# Patient Record
Sex: FEMALE | Race: BLACK OR AFRICAN AMERICAN
[De-identification: names, ages, dates, MRNs, and addresses within clinical notes are randomized per-mention and may not be internally consistent; named-entity substitution may affect disease eponyms.]

---

## 2018-08-29 ENCOUNTER — HOSPITAL ENCOUNTER (OUTPATIENT)
Dept: HOSPITAL 74 - JASU-SURG | Age: 48
Discharge: HOME | End: 2018-08-29
Attending: ORTHOPAEDIC SURGERY
Payer: COMMERCIAL

## 2018-08-29 VITALS — SYSTOLIC BLOOD PRESSURE: 145 MMHG | HEART RATE: 82 BPM | DIASTOLIC BLOOD PRESSURE: 87 MMHG

## 2018-08-29 VITALS — BODY MASS INDEX: 29.2 KG/M2

## 2018-08-29 VITALS — TEMPERATURE: 98.2 F
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
## 2018-08-29 DIAGNOSIS — S82.841A: Primary | ICD-10-CM
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
## 2018-08-29 PROCEDURE — 0QSG04Z REPOSITION RIGHT TIBIA WITH INTERNAL FIXATION DEVICE, OPEN APPROACH: ICD-10-PCS | Performed by: ORTHOPAEDIC SURGERY

## 2018-08-29 PROCEDURE — 27814 TREATMENT OF ANKLE FRACTURE: CPT

## 2018-08-29 NOTE — OP
Operative Note





- Note:


Operative Date: 08/29/18


Pre-Operative Diagnosis: right ankle tibia/fibula fracture


Operation: open reduction internal fixation of bimalleolar right ankle fracture


Surgeon: Blanco Pace


Assistant: Rossana Last


Anesthesiologist/CRNA: Josh Kennedy


Anesthesia: Local


Estimated Blood Loss (mls): 20


Fluid Volume Replaced (mls): 800


Operative Report Dictated: Yes

## 2018-08-29 NOTE — SURG
Surgery First Assist Note


First Assist: Rossana Last PA-C


Date of Service: 08/29/18


Diagnosis: 





right ankle bimalleolar fracture


Procedure: 





open reduction/internal fixation of bimalleolar ankle fracture


I was present for the entirety of the operative procedure. For further detail, 

please refer to operative report.








Visit type





- Case Type


Case Type: Scheduled





- Emergency


Emergency Visit: No





- New patient


This patient is new to me today: Yes


Date on this admission: 08/29/18

## 2018-08-30 NOTE — OP
DATE OF OPERATION:  08/29/2018

 

PREOPERATIVE DIAGNOSIS:  Right bimalleolar ankle fracture.    

 

POSTOPERATIVE DIAGNOSIS:  Right bimalleolar ankle fracture.  

 

PROCEDURE:  Open reduction internal fixation of right bimalleolar ankle fracture.  

 

SURGICAL ATTENDING:  Blanco Pace MD

 

FIRST ASSISTANT:  CHRISTIANO Solitario

 

ANESTHESIA:  Spinal and regional.  

 

CLOSURE:  Lind plate and screws with a 5-0 lateral locking plate, appropriate

screws 2 cannulated 4.0 screws medially, 0 Vicryl fascia, 2-0 Vicryl subcutaneous, 

staples for skin.  

 

ESTIMATED BLOOD LOSS:  Less than 100 mL. 

 

COMPLICATIONS:  None.   

 

CONDITION:  To recovery room in stable condition.

 

DESCRIPTION OF OPERATIVE PROCEDURE:  Patient was taken to the operating room on

August 29, 2018.  Spinal and regional anesthesia was administered by the

anesthesiologist.  IV Kefzol was administered prophylactically prior to the case. 

Right lower extremity was prepped and draped in the usual sterile fashion.  

 

A 10-cm longitudinal incision over the tip, the distal end of fibula was incised from

the ankle joint up proximally.  Sharp dissection was carried one level down to the

level of the fracture.  Curettes and irrigation were used to remove soft tissue and

clots from around the fracture.  A serrated reduction clamp was used to obtain an

anatomical reduction of the fracture.  There was a quite large avulsion piece that

was on the anterior distal aspect where the anterior tibia-fibular ligament was

attached to that was also held in an anatomical position using a 0.2 reduction clamp.

 The fracture was lagged from anterior to posterior achieving good lag compression. 

A 5-hole distal fibular plate was then _____ aspect fibula, multiple proximal and

distal screws were drilled, depth gauged and secured with the appropriate size and

type of screw.  The initial 2 screws were non-locking to synch the plate to the bone.

 Then, the remaining screws that were placed were locked with locking variety. 

Excellent reduction and rigidity of the fracture were obtained.  

 

Next, our attention was directed to the medial malleolus.  A 4-cm curved longitudinal

incision over the medial malleolus was incised.  Hemostasis was achieved using Bovie

cautery.  Sharp dissection was carried down to the level of the fracture.  Curettes

and irrigation were used to remove soft tissue and clots from around the fracture.  A

0.2 reduction clamp was used to obtain an anatomical reduction of the fracture.  Two

guidewires from the 4.0 cannulated screws were drilled from the tip of the medial

malleolus, past the fracture, into the distal tibia grabbing the posterior cortex. 

Proper placement was confirmed with the AP and lateral planes and mortise views using

the image intensifier.  They were measured for length and screwed with the

appropriate size 4.0 lag screw achieved excellent fixation and compression across the

fracture.  The wires were removed.  X-rays in the AP, lateral, and mortise views

revealed anatomic reduction of the fracture in all planes.  

 

Both incisions were irrigated out with copious amounts of irrigation.  Fascia was

closed with 0 Vicryl, 2-0 for subcutaneous, and staples for skin.  Sterile pressure

dressing followed by a U-Splint was applied.  Patient was awakened from anesthesia

and transferred to recovery in stable condition.  No complication.  Estimated blood

loss was negligible.  

 

 

CHASE [id number]

DD: 08/29/2018 18:02

DT: 08/30/2018 11:20

Job #:  [id number]